# Patient Record
Sex: FEMALE | Race: WHITE | Employment: UNEMPLOYED | URBAN - METROPOLITAN AREA
[De-identification: names, ages, dates, MRNs, and addresses within clinical notes are randomized per-mention and may not be internally consistent; named-entity substitution may affect disease eponyms.]

---

## 2017-02-01 ENCOUNTER — HOSPITAL ENCOUNTER (EMERGENCY)
Facility: CLINIC | Age: 62
Discharge: HOME | End: 2017-02-01
Attending: EMERGENCY MEDICINE | Admitting: EMERGENCY MEDICINE
Payer: COMMERCIAL

## 2017-02-01 VITALS
WEIGHT: 189 LBS | TEMPERATURE: 98.3 F | HEIGHT: 64 IN | DIASTOLIC BLOOD PRESSURE: 80 MMHG | SYSTOLIC BLOOD PRESSURE: 142 MMHG | OXYGEN SATURATION: 97 % | RESPIRATION RATE: 13 BRPM | BODY MASS INDEX: 32.27 KG/M2

## 2017-02-01 DIAGNOSIS — F41.0 ANXIETY ATTACK: ICD-10-CM

## 2017-02-01 PROCEDURE — 99283 EMERGENCY DEPT VISIT LOW MDM: CPT

## 2017-02-01 PROCEDURE — 25000132 ZZH RX MED GY IP 250 OP 250 PS 637: Performed by: EMERGENCY MEDICINE

## 2017-02-01 RX ORDER — ALPRAZOLAM 0.5 MG
0.5 TABLET ORAL 2 TIMES DAILY PRN
Qty: 6 TABLET | Refills: 0 | Status: SHIPPED | OUTPATIENT
Start: 2017-02-01

## 2017-02-01 RX ORDER — ALPRAZOLAM 0.25 MG
0.5 TABLET ORAL ONCE
Status: COMPLETED | OUTPATIENT
Start: 2017-02-01 | End: 2017-02-01

## 2017-02-01 RX ADMIN — ALPRAZOLAM 0.5 MG: 0.25 TABLET ORAL at 15:18

## 2017-02-01 ASSESSMENT — ENCOUNTER SYMPTOMS
AGITATION: 1
NERVOUS/ANXIOUS: 1
HALLUCINATIONS: 0
SLEEP DISTURBANCE: 0

## 2017-02-01 NOTE — ED PROVIDER NOTES
"  History     Chief Complaint:  Agitation    HPI   Mindy Del Angel is a 61 year old female who presents with concerns for anxiety and agitation. The patient reports one week of increased agitation, anxiety and is \"crawling out of my skin.\" The patient was referred to the ED for evaluation after Skyping with her therapist. The patient has had recent changes in her medications secondary to psychiatrist changes. Her long time psychiatrist retired in August of last year and her following psychiatrist change many of her long standing medications. The patient has currently stopped following with this psychiatrist and has started with Dr. Zaidi, last seeing her on 1/20/2017. The patient has another scheduled appointment in one month. The patient had a recent increased of Lithium from 300 to 600 mg at night and is currently slowly increasing her Lamictal dose. Patient also takes Seroquel at night. She has been on Prozac in the past but is not currently. She also occasionally self medicates with alcohol but denies any recent alcohol or drug ingestion. The patient denies any suicidal ideation, homicidal ideation, hallucinations or any other symptoms.     Allergies:  No Known Allergies     Medications:    Warfarin Sodium (COUMADIN PO)  Levothyroxine Sodium (LEVOTHROID PO)  LITHIUM CARBONATE PO  LAMOTRIGINE PO  QUETIAPINE FUMARATE PO  mycophenolate (CELLCEPT-BRAND NAME) 500 MG tablet    Past Medical History:    Clotting disorder  Thyroid disease  Autoimmune hepatitis  Bipolar disorder 1     Past Surgical History:    Orthopedic surgery  Vascular surgery  Appendectomy      Family History:    No family history on file.    Social History:  Marital Status:  Single   Smoking status: Current smoker  Alcohol use: No      Review of Systems   Psychiatric/Behavioral: Positive for agitation. Negative for suicidal ideas, hallucinations and sleep disturbance. The patient is nervous/anxious.    All other systems reviewed and are " "negative.      Physical Exam     Patient Vitals for the past 24 hrs:   BP Temp Temp src Heart Rate Resp SpO2 Height Weight   02/01/17 1642 142/80 mmHg - - 100 13 97 % - -   02/01/17 1414 147/82 mmHg 98.3  F (36.8  C) Oral 112 12 97 % 1.626 m (5' 4\") 85.73 kg (189 lb)       Physical Exam  General:  Patient seems somewhat anxious.  HENT:  Grossly normal. Mucous membranes are moist.  Eyes:   Pupils are equal and reactive.  Lungs:  Clear to ausculation  CV:   Heart sounds are normal. Good radial pulses.  Abdomen:  Soft. Non-tender.  Neuro:  Awake, alert and appropriate.  Skin:   No diaphoresis or rash.  Psych:  Patient seems anxious. Makes fairly good eye contact. Thought processes are clear.   No hallucinations.     Emergency Department Course     Interventions:  (3984) Xanax 0.5 mg PO    Emergency Department Course:  Nursing notes and vitals reviewed.  (1506) I performed an exam of the patient as documented above.    (1615) Findings and plan explained to the patient. Patient discharged home with instructions regarding supportive care, medications, and reasons to return. The importance of close follow-up was reviewed. The patient was prescribed Xanax.      Impression & Plan      Medical Decision Making:  Mindy Del Angel is a 61 year old female who comes in with significant anxiety. She had history of anxiety and used to be on klonopin years ago. She was given 0.5 mg Xanax just as a symptomatic trial and she felt much better. She has been having adjustments of her medications and needs to get in to see her psychiatrist either this week or first thing next week for recheck. I told her that I would prescribe her just 6 tablets to try and help her get through the next few days and to be only used for severe anxiety. She agreed. She is not suicidal. I do not feel that she needs to come in to the hosptial at this time and she agrees.     Diagnosis:    ICD-10-CM   1. Anxiety attack F41.0       Disposition:  Xanax only as " needed for severe anxiety. Recheck within the next 4-5 days with your psychiatrist, return to the ER if you feel like hurting yourself.      Discharge Medications:  New Prescriptions    ALPRAZOLAM (XANAX) 0.5 MG TABLET    Take 1 tablet (0.5 mg) by mouth 2 times daily as needed for anxiety         Salomon Shipley  2/1/2017    EMERGENCY DEPARTMENT    I, Salomon Shipley, am serving as a scribe on 2/1/2017 at 3:06 PM to personally document services performed by Dr. Hernandez based on my observations and the provider's statements to me.      Katina Hernandez MD  02/01/17 9344

## 2017-02-01 NOTE — DISCHARGE INSTRUCTIONS
Xanax only as needed for severe anxiety.  Recheck within the next 4-5 days with your psychiatrist, return to the ER if you feel like hurting yourself.          Understanding Anxiety Disorders  Almost everyone gets nervous now and then. It s normal to have knots in your stomach before a test, or for your heart to race on a first date. But an anxiety disorder is much more than a case of nerves. In fact, its symptoms may be overwhelming. But treatment can relieve many of these symptoms. Talking to your doctor is the first step.    What are anxiety disorders?  An anxiety disorder causes intense feelings of panic and fear. These feelings may arise for no apparent reason. And they tend to recur again and again. They may prevent you from coping with life and cause you great distress. As a result, you may avoid anything that triggers your fear. In extreme cases, you may never leave the house. Anxiety disorders may cause other symptoms, such as:    Obsessive thoughts you can t control    Constant nightmares or painful thoughts of the past    Nausea, sweating, and muscle tension    Difficulty sleeping or concentrating  What causes anxiety disorders?  Anxiety disorders tend to run in families. For some people, childhood abuse or neglect may play a role. For others, stressful life events or trauma may trigger anxiety disorders. Anxiety can trigger low self-esteem and poor coping skills.  Common anxiety disorders    Panic disorder: This causes an intense fear of being in danger.    Phobias: These are extreme fears of certain objects, places, or events.    Obsessive-compulsive disorder: This causes you to have unwanted thoughts. You also may perform certain actions over and over.    Posttraumatic stress disorder: This occurs in people who have survived a terrible ordeal. It can cause nightmares and flashbacks about the event.    Generalized anxiety disorder: This causes constant worry that can greatly disrupt your life.   Getting  better  You may believe that nothing can help you. Or, you might fear what others may think. But most anxiety symptoms can be eased. Having an anxiety disorder is nothing to be ashamed of. Most people do best with treatment that combines medication and therapy. Although these aren t cures, they can help you live a healthier life.    9623-3958 [employer]. [address] [place] [address], [place] [address]. All rights reserved. This information is not intended as a substitute for professional medical care. Always follow your healthcare professional's instructions.

## 2017-02-01 NOTE — ED AVS SNAPSHOT
Emergency Department    [address] [place] [address]    Phone:  [phone]    Fax:  [phone]                                       Mindy Del Angel   MRN: [id number]    Department:   Emergency Department   Date of Visit:  2/1/2017           After Visit Summary Signature Page     I have received my discharge instructions, and my questions have been answered. I have discussed any challenges I see with this plan with the nurse or doctor.    ..........................................................................................................................................  Patient/Patient Representative Signature      ..........................................................................................................................................  Patient Representative Print Name and Relationship to Patient    ..................................................               ................................................  Date                                            Time    ..........................................................................................................................................  Reviewed by Signature/Title    ...................................................              ..............................................  Date                                                            Time

## 2017-02-01 NOTE — ED AVS SNAPSHOT
Emergency Department    [address] [facility] [address]    Phone:  [phone]    Fax:  [phone]                                       Mindy Del Angel   MRN: [id number]    Department:   Emergency Department   Date of Visit:  2/1/2017           Patient Information     Date Of Birth          1955        Your diagnoses for this visit were:     Anxiety attack        You were seen by Katina Hernandez MD.      Follow-up Information     Follow up with Madalyn Mejia MD.    Specialty:  Family Practice    Contact information:    "[employer]  [address]  [facility] [address]  [phone]          Discharge Instructions       Xanax only as needed for severe anxiety.  Recheck within the next 4-5 days with your psychiatrist, return to the ER if you feel like hurting yourself.          Understanding Anxiety Disorders  Almost everyone gets nervous now and then. It s normal to have knots in your stomach before a test, or for your heart to race on a first date. But an anxiety disorder is much more than a case of nerves. In fact, its symptoms may be overwhelming. But treatment can relieve many of these symptoms. Talking to your doctor is the first step.    What are anxiety disorders?  An anxiety disorder causes intense feelings of panic and fear. These feelings may arise for no apparent reason. And they tend to recur again and again. They may prevent you from coping with life and cause you great distress. As a result, you may avoid anything that triggers your fear. In extreme cases, you may never leave the house. Anxiety disorders may cause other symptoms, such as:    Obsessive thoughts you can t control    Constant nightmares or painful thoughts of the past    Nausea, sweating, and muscle tension    Difficulty sleeping or concentrating  What causes anxiety disorders?  Anxiety disorders tend to run in families. For some people, childhood abuse or neglect may play a role. For others,  stressful life events or trauma may trigger anxiety disorders. Anxiety can trigger low self-esteem and poor coping skills.  Common anxiety disorders    Panic disorder: This causes an intense fear of being in danger.    Phobias: These are extreme fears of certain objects, places, or events.    Obsessive-compulsive disorder: This causes you to have unwanted thoughts. You also may perform certain actions over and over.    Posttraumatic stress disorder: This occurs in people who have survived a terrible ordeal. It can cause nightmares and flashbacks about the event.    Generalized anxiety disorder: This causes constant worry that can greatly disrupt your life.   Getting better  You may believe that nothing can help you. Or, you might fear what others may think. But most anxiety symptoms can be eased. Having an anxiety disorder is nothing to be ashamed of. Most people do best with treatment that combines medication and therapy. Although these aren t cures, they can help you live a healthier life.    3391-0681 [employer]. [address] [place] [address]. All rights reserved. This information is not intended as a substitute for professional medical care. Always follow your healthcare professional's instructions.          24 Hour Appointment Hotline       To make an appointment at any [facility], call [phone] ([phone]). If you don't have a family doctor or clinic, we will help you find one. [facility] clinics are conveniently located to serve the needs of you and your family.             Review of your medicines      START taking        Dose / Directions Last dose taken    ALPRAZolam 0.5 MG tablet   Commonly known as:  XANAX   Dose:  0.5 mg   Quantity:  6 tablet        Take 1 tablet (0.5 mg) by mouth 2 times daily as needed for anxiety   Refills:  0          Our records show that you are taking the medicines listed below. If these are incorrect, please call your family doctor or  clinic.        Dose / Directions Last dose taken    CIPROFLOXACIN PO   Dose:  250 mg        Take 250 mg by mouth 2 times daily X 7 days, for UTI, just started 9/14/14   Refills:  0        COUMADIN PO   Dose:  10 mg        Take 10 mg by mouth daily   Refills:  0        enoxaparin 100 MG/ML injection   Commonly known as:  LOVENOX   Dose:  1 mg/kg   Quantity:  8 Syringe        Inject 0.82 mLs (82 mg) Subcutaneous every 12 hours   Refills:  1        HYDROcodone-acetaminophen 5-325 MG per tablet   Commonly known as:  NORCO   Dose:  1-2 tablet   Quantity:  20 tablet        Take 1-2 tablets by mouth every 4 hours as needed for moderate to severe pain   Refills:  0        LAMOTRIGINE PO   Dose:  200 mg        Take 200 mg by mouth 2 times daily   Refills:  0        LEVOTHROID PO   Dose:  112 mcg        Take 112 mcg by mouth daily   Refills:  0        * LITHIUM CARBONATE PO   Dose:  600 mg        Take 600 mg by mouth every morning   Refills:  0        * LITHIUM CARBONATE PO   Dose:  300 mg        Take 300 mg by mouth every evening   Refills:  0        mycophenolate tablet   Dose:  500 mg        Take 500 mg by mouth 2 times daily   Refills:  0        PROZAC PO   Dose:  20 mg        Take 20 mg by mouth daily   Refills:  0        QUETIAPINE FUMARATE PO   Dose:  25 mg        Take 25 mg by mouth daily as needed   Refills:  0        * Notice:  This list has 2 medication(s) that are the same as other medications prescribed for you. Read the directions carefully, and ask your doctor or other care provider to review them with you.            Prescriptions were sent or printed at these locations (1 Prescription)                   Other Prescriptions                Printed at Department/Unit printer (1 of 1)         ALPRAZolam (XANAX) 0.5 MG tablet                Orders Needing Specimen Collection     None      Pending Results     No orders found from 1/31/2017 to 2/2/2017.            Pending Culture Results     No orders found from  1/31/2017 to 2/2/2017.             Test Results from your hospital stay            Clinical Quality Measure: Blood Pressure Screening     Your blood pressure was checked while you were in the emergency department today. The last reading we obtained was  BP: 147/82 mmHg . Please read the guidelines below about what these numbers mean and what you should do about them.  If your systolic blood pressure (the top number) is less than 120 and your diastolic blood pressure (the bottom number) is less than 80, then your blood pressure is normal. There is nothing more that you need to do about it.  If your systolic blood pressure (the top number) is 120-139 or your diastolic blood pressure (the bottom number) is 80-89, your blood pressure may be higher than it should be. You should have your blood pressure rechecked within a year by a primary care provider.  If your systolic blood pressure (the top number) is 140 or greater or your diastolic blood pressure (the bottom number) is 90 or greater, you may have high blood pressure. High blood pressure is treatable, but if left untreated over time it can put you at risk for heart attack, stroke, or kidney failure. You should have your blood pressure rechecked by a primary care provider within the next 4 weeks.  If your provider in the emergency department today gave you specific instructions to follow-up with your doctor or provider even sooner than that, you should follow that instruction and not wait for up to 4 weeks for your follow-up visit.        Thank you for choosing [facility]       Thank you for choosing [facility] for your care. Our goal is always to provide you with excellent care. Hearing back from our patients is one way we can continue to improve our services. Please take a few minutes to complete the written survey that you may receive in the mail after you visit with us. Thank you!        [employer] Information     [employer] lets you send messages to your doctor, view your  "test results, renew your prescriptions, schedule appointments and more. To sign up, go to [email or web address] . Click on \"Log in\" on the left side of the screen, which will take you to the Welcome page. Then click on \"Sign up Now\" on the right side of the page.     You will be asked to enter the access code listed below, as well as some personal information. Please follow the directions to create your username and password.     Your access code is: [email or web address]  Expires: 2017  2:51 PM     Your access code will  in 90 days. If you need help or a new code, please call your [place] clinic or [phone].        Care EveryWhere ID     This is your Care EveryWhere ID. This could be used by other organizations to access your [place] medical records  [id number]        After Visit Summary       This is your record. Keep this with you and show to your community pharmacist(s) and doctor(s) at your next visit.                  "